# Patient Record
(demographics unavailable — no encounter records)

---

## 2025-03-24 NOTE — PHYSICAL EXAM
[NL] : warm, clear [Acute Distress] : no acute distress [Alert] : alert [Tired appearing] : tired appearing [Tenderness] : no tenderness [Laceration] : no laceration [Conjuctival Injection] : no conjunctival injection [Clear] : right tympanic membrane clear [Mucoid Discharge] : mucoid discharge [Inflamed Nasal Mucosa] : inflamed nasal mucosa [Hypertrophied Nasal Mucosa] : hypertrophied nasal mucosa [Supple] : supple [FROM] : full passive range of motion [Symmetric Chest Wall] : symmetric chest wall [Tenderness With Palpation of Chest Wall] : no tenderness with palpation of chest wall [Clear to Auscultation Bilaterally] : clear to auscultation bilaterally [Wheezing] : no wheezing [Rales] : no rales [Rhonchi] : no rhonchi

## 2025-03-24 NOTE — HISTORY OF PRESENT ILLNESS
[de-identified] : Fever, congestion, exposure to influenza [FreeTextEntry6] :  Moiz had ever up to 102 F for two days, responding to antipyretics. He has nasal congestion and a cough. The cough occurs during the day, it is nonproductive.  He was exposed to influenza two days ago. A rapid test done at home revealed a negative influenza and Covid-19. He has a low appetite but is able to drink well.

## 2025-03-24 NOTE — DISCUSSION/SUMMARY
[FreeTextEntry1] : The rapid influenza test is negative. Discussed the viral nature of the illness. Return if the fever persists for 5 days, or if the cough does not improve. Rest and increase hydration.